# Patient Record
Sex: FEMALE | Race: WHITE | NOT HISPANIC OR LATINO
[De-identification: names, ages, dates, MRNs, and addresses within clinical notes are randomized per-mention and may not be internally consistent; named-entity substitution may affect disease eponyms.]

---

## 2021-12-09 PROBLEM — Z00.00 ENCOUNTER FOR PREVENTIVE HEALTH EXAMINATION: Status: ACTIVE | Noted: 2021-12-09

## 2021-12-13 ENCOUNTER — APPOINTMENT (OUTPATIENT)
Dept: CARDIOLOGY | Facility: CLINIC | Age: 61
End: 2021-12-13
Payer: COMMERCIAL

## 2021-12-13 ENCOUNTER — NON-APPOINTMENT (OUTPATIENT)
Age: 61
End: 2021-12-13

## 2021-12-13 VITALS
HEIGHT: 65 IN | HEART RATE: 74 BPM | SYSTOLIC BLOOD PRESSURE: 180 MMHG | WEIGHT: 164 LBS | OXYGEN SATURATION: 98 % | DIASTOLIC BLOOD PRESSURE: 100 MMHG | BODY MASS INDEX: 27.32 KG/M2

## 2021-12-13 VITALS
OXYGEN SATURATION: 98 % | WEIGHT: 164 LBS | TEMPERATURE: 97.5 F | HEART RATE: 74 BPM | HEIGHT: 65 IN | BODY MASS INDEX: 27.32 KG/M2 | DIASTOLIC BLOOD PRESSURE: 92 MMHG | SYSTOLIC BLOOD PRESSURE: 170 MMHG

## 2021-12-13 DIAGNOSIS — Z82.49 FAMILY HISTORY OF ISCHEMIC HEART DISEASE AND OTHER DISEASES OF THE CIRCULATORY SYSTEM: ICD-10-CM

## 2021-12-13 DIAGNOSIS — Z78.9 OTHER SPECIFIED HEALTH STATUS: ICD-10-CM

## 2021-12-13 DIAGNOSIS — Z72.3 LACK OF PHYSICAL EXERCISE: ICD-10-CM

## 2021-12-13 PROCEDURE — 99204 OFFICE O/P NEW MOD 45 MIN: CPT

## 2021-12-13 PROCEDURE — 99072 ADDL SUPL MATRL&STAF TM PHE: CPT

## 2021-12-13 RX ORDER — LEVOTHYROXINE SODIUM 0.1 MG/1
100 TABLET ORAL
Qty: 90 | Refills: 1 | Status: ACTIVE | COMMUNITY
Start: 2021-11-20

## 2021-12-13 NOTE — PHYSICAL EXAM

## 2021-12-13 NOTE — REASON FOR VISIT
Chart reviewed and call made to patient. Patient stated that he has a blood blister in his cheek. Patient stated that he has been working with the anticoagulation clinic to get back on his warfarin. Patient stated that he was supposed to have a procedure on October 20th but it got cancelled due to an insurance issue. Consequently, he has been on lovenox and warfarin since October 21, and is transitioning back to warfarin. Next scheduled warfarin check is on 11/04/2021.     Patient stated that yesterday he noticed the blood blister last night after dinner and it was small. Patient stated that this morning when he got up it was larger at about a quarter inch diameter. Patient stated that now the center is \"clearing up\" in the center but the edges are continuing to spread. Patient denies any active bleeding anywhere, and the blood is contained in the blister.    Patient stated that he burned his mouth with coffee yesterday and isn't sure whether that contributed to the development of the blister.    ALISHA notified and order received that patient should continue to monitor. Call made to patient and notified him that the blister is likely due to the burn. Notified patient to consume food and drinks of a moderate temperature. Notified patient that if the blister is sore to chew foods on the other side of his mouth and if that still is painful to stick with soft foods. Notified patient that there may be some sloughing off of the burned lining of his mouth. Patient verbalized understanding. Encouraged patient to call with any other questions or concerns.     [FreeTextEntry1] : Valorie is a pleasant 61-year-old female with history of hypothyroidism (Hashimoto's disease) on supplements, labile hypertension, fatty liver on ultrasound many years ago and, hypercholesterolemia.  \par \par She gained more than 40 pounds after she had menopause.  He has not been able to lose weight.\par \par She admits to drinking several glasses of wine on daily basis.  She does not have any symptoms of liver dysfunction although she has mild LFT elevation on labs.\par \par Denies chest pain or shortness of breath.  She does have palpitations but no dizziness or syncope.  No pedal edema.\par \par Hypertension: The patient was treated with a diuretic.  She had leg cramps and she stopped it.\par \par

## 2021-12-13 NOTE — DISCUSSION/SUMMARY
[FreeTextEntry1] : Hypertension: Uncontrolled.  Start metoprolol.  It will also help anxiety, labile hypertension and palpitations.  Nonpharmacologic ways of reducing blood pressure were discussed.  Symptoms of uncontrolled high blood pressure were discussed.  If any, she should go to the ER stat.  Patient understands.  If she takes diuretics in future, she will need to be supplemented with K and mag.  She had muscle cramps with low-dose diuretics in the past.\par \par Hypercholesteremia: Dietary changes to reduce LDL was discussed.  She does not want to start statins at this time.  She is concerned about abnormal LFT.  Cutting back on alcohol is most essential in her case.  She will recheck lipid profile.  If still elevated, she may agree to statin therapy.\par \par Overweight: Because of joint pains and fatigue from thyroid dysfunction, she claims that she is unable to exercise.  Dietary reduction of total calories and carbs was recommended.\par \par Patient has strong family history of premature CAD.  She has several uncontrolled risk factors.  CT calcium score should be obtained.  Significance of calcium score was discussed.\par \par Palpitations: She describes them as fluttering sensation when she wakes up in the night, sometimes with nightmares.  ZIO recording for 3 days should be able to capture her symptoms\par \par Patient with baseline LBBB.  In the future, she will need Lexiscan if CAD evaluation is required.\par \par Follow-up after above tests\par \par Thank you for this referral and allowing me to participate in the care of this patient.  If I can be of any further help or  if you have any questions, please do not hesitate to contact me\par \par \par Sincerely,\par \par Sukh Fraire MD, FACC, [facility]\par \par

## 2021-12-13 NOTE — ASSESSMENT
[FreeTextEntry1] : Reviewed today:\par -EKG December 2021: Sinus rhythm, LBBB\par -Labs May 2021: LDL is 176, HDL 75, .  .  Normal creatinine.  AST 41, ALT 85.  TSH 5.7.\par -Echo 2012, normal\par -Stress echocardiogram 2012: Negative for ischemia.\par -Ultrasound liver in April 2021: Fatty liver.

## 2021-12-21 ENCOUNTER — APPOINTMENT (OUTPATIENT)
Dept: CARDIOLOGY | Facility: CLINIC | Age: 61
End: 2021-12-21
Payer: COMMERCIAL

## 2021-12-21 PROCEDURE — 93306 TTE W/DOPPLER COMPLETE: CPT

## 2021-12-21 PROCEDURE — 93880 EXTRACRANIAL BILAT STUDY: CPT

## 2021-12-21 PROCEDURE — 99072 ADDL SUPL MATRL&STAF TM PHE: CPT

## 2022-01-07 ENCOUNTER — RX RENEWAL (OUTPATIENT)
Age: 62
End: 2022-01-07

## 2022-01-24 ENCOUNTER — RX RENEWAL (OUTPATIENT)
Age: 62
End: 2022-01-24

## 2022-01-26 ENCOUNTER — RX RENEWAL (OUTPATIENT)
Age: 62
End: 2022-01-26

## 2022-01-27 ENCOUNTER — APPOINTMENT (OUTPATIENT)
Dept: CARDIOLOGY | Facility: CLINIC | Age: 62
End: 2022-01-27
Payer: COMMERCIAL

## 2022-01-27 VITALS
HEIGHT: 65 IN | BODY MASS INDEX: 27.66 KG/M2 | WEIGHT: 166 LBS | HEART RATE: 73 BPM | OXYGEN SATURATION: 98 % | TEMPERATURE: 97.3 F

## 2022-01-27 VITALS — DIASTOLIC BLOOD PRESSURE: 92 MMHG | SYSTOLIC BLOOD PRESSURE: 130 MMHG

## 2022-01-27 VITALS — SYSTOLIC BLOOD PRESSURE: 160 MMHG | DIASTOLIC BLOOD PRESSURE: 90 MMHG

## 2022-01-27 PROCEDURE — 99214 OFFICE O/P EST MOD 30 MIN: CPT

## 2022-01-27 PROCEDURE — 99072 ADDL SUPL MATRL&STAF TM PHE: CPT

## 2022-01-27 RX ORDER — METOPROLOL TARTRATE 25 MG/1
25 TABLET, FILM COATED ORAL
Qty: 180 | Refills: 1 | Status: DISCONTINUED | COMMUNITY
Start: 2021-12-13 | End: 2022-01-27

## 2022-01-27 NOTE — DISCUSSION/SUMMARY
[FreeTextEntry1] : Hypertension: Uncontrolled.  She could not tolerate metoprolol.  Blood pressures are not controlled on her home blood pressure log as well as blood pressure reading in the office today.  Change to Cardizem  mg p.o. daily.  Side effects were discussed.  She may need additional ACE inhibitor or diuretic if the blood pressures are not well controlled. \par \par Echocardiogram showed normal EF.  Normal diastolic function.  Normal PASP\par \par If she takes diuretics in future, she will need to be supplemented with K and mag.  She had muscle cramps with low-dose diuretics in the past.\par \par Hypercholesteremia: Dietary changes to reduce LDL was discussed.  She does not want to start statins at this time.  She is concerned about abnormal LFT.  Cutting back on alcohol is most essential in her case.  Lipid profile in December showed LDL of 147.  Calculated 10-year cardiovascular risk is 4.5%.  HDL was 74.\par \par Overweight: Because of joint pains and fatigue from thyroid dysfunction, she claims that she is unable to exercise.  Dietary reduction of total calories and carbs was recommended.\par \par Patient has strong family history of premature CAD.  She has several uncontrolled risk factors.  CT calcium score should be obtained.  Significance of calcium score was discussed.\par \par Palpitations: She describes them as fluttering sensation when she wakes up in the night, sometimes with nightmares.  ZIO recording for 3 days was performed and it showed asymptomatic 6 beat SVT\par \par Patient with baseline LBBB.  In the future, she will need Lexiscan if CAD evaluation is required.\par \par Follow-up for blood pressure next week.  Also CT calcium score is pending.\par \par Thank you for this referral and allowing me to participate in the care of this patient.  If I can be of any further help or  if you have any questions, please do not hesitate to contact me\par \par \par Sincerely,\par \par Sukh Fraire MD, FACC, [facility]\par \par

## 2022-01-27 NOTE — PHYSICAL EXAM

## 2022-01-27 NOTE — REVIEW OF SYSTEMS
[Feeling Fatigued] : feeling fatigued [Dyspnea on exertion] : dyspnea during exertion [Negative] : Psychiatric [Palpitations] : no palpitations

## 2022-01-27 NOTE — REASON FOR VISIT
[FreeTextEntry1] : Valorie is a pleasant 61-year-old female with history of hypothyroidism (Hashimoto's disease) on supplements, labile hypertension, fatty liver on ultrasound many years ago and, hypercholesterolemia.  \par \par She gained more than 40 pounds after she had menopause.  SHe has not been able to lose weight.\par \par She admits to drinking several glasses of wine on daily basis.  She does not have any symptoms of liver dysfunction although she had mild LFT elevation on labs in the past, not on the last labs in December 2021\par \par Denies chest pain or shortness of breath.  She does have palpitations but no dizziness or syncope.  No pedal edema.\par \par Blood pressures are not well controlled on metoprolol today.  She complains of fatigue and headache with metoprolol.\par \par Hypertension: The patient was treated with a diuretic.  She had leg cramps and she stopped it.\par \par

## 2022-02-03 ENCOUNTER — APPOINTMENT (OUTPATIENT)
Dept: CARDIOLOGY | Facility: CLINIC | Age: 62
End: 2022-02-03

## 2022-02-04 ENCOUNTER — APPOINTMENT (OUTPATIENT)
Dept: CARDIOLOGY | Facility: CLINIC | Age: 62
End: 2022-02-04
Payer: COMMERCIAL

## 2022-02-04 ENCOUNTER — NON-APPOINTMENT (OUTPATIENT)
Age: 62
End: 2022-02-04

## 2022-02-04 VITALS
OXYGEN SATURATION: 98 % | SYSTOLIC BLOOD PRESSURE: 162 MMHG | WEIGHT: 162 LBS | HEART RATE: 76 BPM | DIASTOLIC BLOOD PRESSURE: 82 MMHG | HEIGHT: 65 IN | BODY MASS INDEX: 26.99 KG/M2 | TEMPERATURE: 97.8 F

## 2022-02-04 PROCEDURE — 93000 ELECTROCARDIOGRAM COMPLETE: CPT

## 2022-02-04 PROCEDURE — 99214 OFFICE O/P EST MOD 30 MIN: CPT

## 2022-02-04 PROCEDURE — 99072 ADDL SUPL MATRL&STAF TM PHE: CPT

## 2022-02-04 RX ORDER — TURMERIC ROOT EXTRACT 500 MG
TABLET ORAL
Refills: 0 | Status: ACTIVE | COMMUNITY

## 2022-02-04 RX ORDER — LUTEIN 6 MG
TABLET ORAL
Refills: 0 | Status: ACTIVE | COMMUNITY

## 2022-02-04 RX ORDER — CHOLECALCIFEROL (VITAMIN D3) 50 MCG
2000 CAPSULE ORAL
Refills: 0 | Status: ACTIVE | COMMUNITY

## 2022-02-04 RX ORDER — CALCIUM CARBONATE 260MG(650)
100 TABLET,CHEWABLE ORAL
Refills: 0 | Status: ACTIVE | COMMUNITY

## 2022-02-04 RX ORDER — CALCIUM CARBONATE/VITAMIN D3 600 MG-10
TABLET ORAL
Refills: 0 | Status: ACTIVE | COMMUNITY

## 2022-02-04 RX ORDER — MULTIVIT-MIN/IRON/FOLIC ACID/K 18-600-40
CAPSULE ORAL
Refills: 0 | Status: ACTIVE | COMMUNITY

## 2022-02-04 RX ORDER — PYRIDOXINE HCL (VITAMIN B6) 100 MG
200 TABLET ORAL
Refills: 0 | Status: ACTIVE | COMMUNITY

## 2022-02-04 RX ORDER — VITAMIN E 268 MG
100 CAPSULE ORAL
Refills: 0 | Status: ACTIVE | COMMUNITY

## 2022-02-04 RX ORDER — MULTIVITAMIN
TABLET ORAL
Refills: 0 | Status: ACTIVE | COMMUNITY

## 2022-02-04 NOTE — ASSESSMENT
[FreeTextEntry1] : Reviewed today:\par -EKG December 2021: Sinus rhythm, LBBB\par -Labs May 2021: LDL is 176, HDL 75, .  .  Normal creatinine.  AST 41, ALT 85.  TSH 5.7.\par -Echo 2012, normal\par -Stress echocardiogram 2012: Negative for ischemia.\par -Ultrasound liver in April 2021: Fatty liver.\par -CT calcium score January 2022: 0\par -Labs February 2022: A1c 6.4.

## 2022-02-04 NOTE — REASON FOR VISIT
[FreeTextEntry1] : Valorie is a pleasant 61-year-old female with history of hypothyroidism (Hashimoto's disease) on supplements, labile hypertension, fatty liver on ultrasound many years ago and, hypercholesterolemia.  \par \par She gained more than 40 pounds after she had menopause.  SHe has not been able to lose weight.  A1c 6.4\par \par Blood pressure not well controlled.  She is taking Cardizem regularly.  No side effects to it. She had fatigue and headache with metoprolol.\par \par She admits to drinking several glasses of wine on daily basis.  She does not have any symptoms of liver dysfunction although she had mild LFT elevation on labs in the past, not on the last labs in December 2021\par \par Denies chest pain or shortness of breath.  She does have palpitations but no  syncope.  No pedal edema.\par \par Transient left bundle branch block.\par \par Complains of vertigo today.  No tinnitus or decreased hearing.\par \par \par

## 2022-02-04 NOTE — PHYSICAL EXAM

## 2022-02-04 NOTE — REVIEW OF SYSTEMS
[Feeling Fatigued] : not feeling fatigued [Dyspnea on exertion] : not dyspnea during exertion [Palpitations] : no palpitations [Negative] : Psychiatric [FreeTextEntry4] : Vertigo

## 2022-02-04 NOTE — DISCUSSION/SUMMARY
[FreeTextEntry1] : Hypertension: Uncontrolled.  She could not tolerate metoprolol.  Blood pressures are not controlled on her home blood pressure log as well as blood pressure reading in the office today.  Change to Cardizem  mg p.o. daily.  Side effects were discussed.  She may need additional ACE inhibitor or diuretic if the blood pressures are not well controlled. \par \par Echocardiogram showed normal EF.  Normal diastolic function.  Normal PASP\par \par CT calcium score was 0\par \par If she takes diuretics in future, she will need to be supplemented with K and mag.  She had muscle cramps with low-dose diuretics in the past.\par \par Hypercholesteremia: Dietary changes to reduce LDL was discussed.  She does not want to start statins at this time.  She is concerned about abnormal LFT.  Cutting back on alcohol is most essential in her case.  Lipid profile in December showed LDL of 147.  Calculated 10-year cardiovascular risk is 4.5%.  HDL was 74.\par \par Overweight: Because of joint pains and fatigue from thyroid dysfunction, she claims that she is unable to exercise.  Dietary reduction of total calories and carbs was recommended.  Her A1c is 6.4.  There is family history of diabetes.\par \par Patient has strong family history of premature CAD.  She has several uncontrolled risk factors.  CT calcium score should be obtained.  Significance of calcium score was discussed.\par \par Palpitations: She describes them as fluttering sensation when she wakes up in the night, sometimes with nightmares.  ZIO recording for 3 days was performed and it showed asymptomatic 6 beat SVT.  \par \par She has transient left bundle branch block.  Today's EKG does not have QRS widening.  T inversion in V1 to V6 is persistent\par \par \par \par Follow-up for blood pressure next week.  Also CT calcium score is pending.\par \par Thank you for this referral and allowing me to participate in the care of this patient.  If I can be of any further help or  if you have any questions, please do not hesitate to contact me\par \par \par Sincerely,\par \par Sukh Fraire MD, FACC, [facility]\par \par

## 2022-02-10 ENCOUNTER — APPOINTMENT (OUTPATIENT)
Dept: CARDIOLOGY | Facility: CLINIC | Age: 62
End: 2022-02-10
Payer: COMMERCIAL

## 2022-02-10 VITALS — SYSTOLIC BLOOD PRESSURE: 164 MMHG | DIASTOLIC BLOOD PRESSURE: 78 MMHG

## 2022-02-10 VITALS
SYSTOLIC BLOOD PRESSURE: 150 MMHG | HEART RATE: 94 BPM | WEIGHT: 162 LBS | TEMPERATURE: 97.7 F | HEIGHT: 65 IN | OXYGEN SATURATION: 100 % | DIASTOLIC BLOOD PRESSURE: 82 MMHG | BODY MASS INDEX: 26.99 KG/M2

## 2022-02-10 DIAGNOSIS — Z78.9 OTHER SPECIFIED HEALTH STATUS: ICD-10-CM

## 2022-02-10 PROCEDURE — 99214 OFFICE O/P EST MOD 30 MIN: CPT

## 2022-02-10 PROCEDURE — 99072 ADDL SUPL MATRL&STAF TM PHE: CPT

## 2022-02-10 NOTE — REVIEW OF SYSTEMS
[Negative] : Psychiatric [Feeling Fatigued] : not feeling fatigued [Dyspnea on exertion] : not dyspnea during exertion [Palpitations] : palpitations [FreeTextEntry4] : Vertigo [FreeTextEntry5] : intermittent

## 2022-02-10 NOTE — ASSESSMENT
[FreeTextEntry1] : Reviewed today:\par -CT calcium score January 2022: 0\par -Labs Dec/February 2022: A1c 6.4 Total Chol 253 HDL 74  AST 33 Alt 56\par -Echo 12/2021: EF 55-60% Mild MR Normal LV systolic function and no seg. wall motion abnormalities. Normal PASP. \par \par -EKG December 2021: Sinus rhythm, LBBB\par -Labs May 2021: LDL is 176, HDL 75, .  .  Normal creatinine.  AST 41, ALT 85.  TSH 5.7.\par -Echo 2012, normal\par -Stress echocardiogram 2012: Negative for ischemia.\par -Ultrasound liver in April 2021: Fatty liver.\par

## 2022-02-10 NOTE — PHYSICAL EXAM
[Well Developed] : well developed [Well Nourished] : well nourished [No Acute Distress] : no acute distress [Normal Venous Pressure] : normal venous pressure [No Carotid Bruit] : no carotid bruit [Normal S1, S2] : normal S1, S2 [No Murmur] : no murmur [No Rub] : no rub [No Gallop] : no gallop [Clear Lung Fields] : clear lung fields [Good Air Entry] : good air entry [No Respiratory Distress] : no respiratory distress  [Normal Gait] : normal gait [Moves all extremities] : moves all extremities [Alert and Oriented] : alert and oriented [de-identified] : 1+ BLE edema

## 2022-02-10 NOTE — DISCUSSION/SUMMARY
[FreeTextEntry1] : CIPRIANO SESAY is a 61 year old F who presents today Feb 10, 2022 with the above history and the following active issues: \par \par Hypertension: \par -Uncontrolled on increased dose of Cardizem  mg. We have added losartan 25mg daily. She could not tolerate metoprolol or diuretics in the past.  Blood pressures are not controlled on her home blood pressure log as well as blood pressure reading in the office today. Side effects were discussed.  She will RTO in 10 days for repeat BP check and review BMP.\par - Non-pharmacological ways of reducing BP have been discussed.\par -Dietary and lifestyle changes to reduce weight including exercise program, reduction of total calories and reduction of carbs was discussed. I have also discussed staying OFF alcohol. \par -If she takes diuretics in future, she will need to be supplemented with K and mag.  She had muscle cramps with low-dose diuretics in the past.\par \par Reviewed in detail her Echo, CT calcium score, carotid  and labs. \par \par Hypercholesteremia: Dietary changes to reduce LDL was discussed.  She does not want to start statins at this time.  She is concerned about abnormal LFT.  Cutting back on alcohol is most essential in her case.  Lipid profile in December showed LDL of 147.  Calculated 10-year cardiovascular risk is 4.5%.  HDL was 74. CT calcium score 0. Primary prevention discussed with patient zo since her family hx of premature CAD\par \par Overweight: Because of joint pains and fatigue from thyroid dysfunction, she claims that she is unable to exercise.  Dietary reduction of total calories and carbs was recommended.  Her A1c is 6.4.  There is family history of diabetes.\par \par Palpitations: She describes them as fluttering sensation when she wakes up in the night, sometimes with nightmares.  ZIO recording for 3 days was performed and it showed asymptomatic 6 beat SVT.  \par \par She has transient left bundle branch block.  Previous visits EKG does not have QRS widening.  T inversion in V1 to V6 is persistent\par \par I have given her names of Endocrinologists in the area- she is to call and see if they take her insurance. \par Due to her anxiety and stress we had a lengthy discussion regarding meditation, yoga, exercise to help with her mental health.\par \par Follow-up for blood pressure next week.\par \par Thank you for this referral and allowing me to participate in the care of this patient.  If I can be of any further help or  if you have any questions, please do not hesitate to contact me\par \par Any questions and concerns were addressed and resolved. \par \par Sincerely,\par \par Shanda Melvin ANP-C\par Patients history, testing, and plan reviewed with supervising MD: Dr. Sukh Fraire MD, FAC, [facility] \par

## 2022-02-10 NOTE — REASON FOR VISIT
[FreeTextEntry1] : Valorie is a pleasant 61-year-old female with history of hypothyroidism (Hashimoto's disease) on supplements, labile hypertension, fatty liver on ultrasound many years ago and, hypercholesterolemia.  \par \par She gained more than 40 pounds after she had menopause.  She has not been able to lose weight.  A1c 6.4. \par \par Blood pressure still not well controlled.  She states she is taking Cardizem regularly at the increased dose.  She has mild dependent edema on medication. She had fatigue and headache with metoprolol previously. She has trailed a diuretic in the past and had muscle cramping. \par \par She admits to drinking several glasses of wine on daily basis however states in the past 2 weeks she has not had any wine.  She does not have any symptoms of liver dysfunction although she had mild LFT elevation on labs in the past, not on the last labs in December 2021.\par \par Denies chest pain or shortness of breath.  She does have palpitations but no syncope.  \par \par Transient left bundle branch block.\par \par \par \par \par

## 2022-02-22 ENCOUNTER — APPOINTMENT (OUTPATIENT)
Dept: CARDIOLOGY | Facility: CLINIC | Age: 62
End: 2022-02-22

## 2022-02-28 ENCOUNTER — APPOINTMENT (OUTPATIENT)
Dept: CARDIOLOGY | Facility: CLINIC | Age: 62
End: 2022-02-28
Payer: COMMERCIAL

## 2022-02-28 VITALS
TEMPERATURE: 97.31 F | SYSTOLIC BLOOD PRESSURE: 150 MMHG | HEIGHT: 65 IN | WEIGHT: 166 LBS | BODY MASS INDEX: 27.66 KG/M2 | DIASTOLIC BLOOD PRESSURE: 84 MMHG | HEART RATE: 80 BPM | OXYGEN SATURATION: 97 %

## 2022-02-28 PROCEDURE — 99072 ADDL SUPL MATRL&STAF TM PHE: CPT

## 2022-02-28 PROCEDURE — 99214 OFFICE O/P EST MOD 30 MIN: CPT

## 2022-02-28 NOTE — REVIEW OF SYSTEMS
[Feeling Fatigued] : not feeling fatigued [Dyspnea on exertion] : not dyspnea during exertion [Palpitations] : palpitations [Negative] : Psychiatric [FreeTextEntry4] : Vertigo [FreeTextEntry5] : intermittent

## 2022-02-28 NOTE — PHYSICAL EXAM
[Well Developed] : well developed [Well Nourished] : well nourished [No Acute Distress] : no acute distress [Normal Venous Pressure] : normal venous pressure [No Carotid Bruit] : no carotid bruit [Normal S1, S2] : normal S1, S2 [No Murmur] : no murmur [No Rub] : no rub [No Gallop] : no gallop [Clear Lung Fields] : clear lung fields [Good Air Entry] : good air entry [No Respiratory Distress] : no respiratory distress  [Normal Gait] : normal gait [No Edema] : no edema [No Cyanosis] : no cyanosis [No Clubbing] : no clubbing [Moves all extremities] : moves all extremities [Alert and Oriented] : alert and oriented

## 2022-02-28 NOTE — ASSESSMENT
[FreeTextEntry1] : Reviewed today:\par -CT calcium score January 2022: 0\par -Labs Dec/February 2022: A1c 6.4 Total Chol 253 HDL 74  AST 33 Alt 56\par -Echo 12/2021: EF 55-60% Mild MR Normal LV systolic function and no seg. wall motion abnormalities. Normal PASP. \par -EKG December 2021: Sinus rhythm, LBBB\par -Labs May 2021: LDL is 176, HDL 75, .  .  Normal creatinine.  AST 41, ALT 85.  TSH 5.7.\par -Echo 2012, normal\par -Stress echocardiogram 2012: Negative for ischemia.\par -Ultrasound liver in April 2021: Fatty liver.\par -BMP February 2022\par

## 2022-02-28 NOTE — DISCUSSION/SUMMARY
[FreeTextEntry1] : CIPRIANO SESAY is a 61 year old F with following active issues: \par \par Hypertension: \par -Uncontrolled on increased dose of Cardizem  mg. We have added losartan 25mg daily. She could not tolerate metoprolol or diuretics in the past.  Blood pressures are not controlled on her home blood pressure log as well as blood pressure reading in the office today.  Increase losartan to 50 mg daily.  Check blood pressure in 1 week.\par - Non-pharmacological ways of reducing BP have been discussed.  He does have significant stress and anxiety\par -Dietary and lifestyle changes to reduce weight including exercise program, reduction of total calories and reduction of carbs was discussed. I have also discussed staying OFF alcohol. \par -If she takes diuretics in future, she will need to be supplemented with K and mag.  She had muscle cramps with low-dose diuretics in the past.\par \par Reviewed in detail her Echo, CT calcium score, carotid  and labs. \par \par Hypercholesteremia: Dietary changes to reduce LDL was discussed.  She does not want to start statins at this time.  She is concerned about abnormal LFT.  Cutting back on alcohol is most essential in her case.  Lipid profile in December showed LDL of 147.  Calculated 10-year cardiovascular risk is 4.5%.  HDL was 74. CT calcium score 0. Primary prevention discussed with patient zo since her family hx of premature CAD\par \par Overweight: Because of joint pains and fatigue from thyroid dysfunction, she claims that she is unable to exercise.  Dietary reduction of total calories and carbs was recommended.  Her A1c is 6.4.  There is family history of diabetes.  Patient is quite frustrated due to weight gain, uncontrolled blood pressures and not feeling well\par \par Palpitations: She describes them as fluttering sensation when she wakes up in the night, sometimes with nightmares.  ZIO recording for 3 days was performed and it showed asymptomatic 6 beat SVT.  This has almost resolved and she does not complain of it anymore\par \par She now complains of vertigo with tinnitus.  Trial of Antivert was suggested.  ENT consultation was suggested.\par \par She has transient left bundle branch block.  Previous visits EKG does not have QRS widening.  T inversion in V1 to V6 is persistent\par \par Thank you for this referral and allowing me to participate in the care of this patient.  If I can be of any further help or  if you have any questions, please do not hesitate to contact me\par \par \par Sincerely,\par \par Sukh Fraire MD, FACC, [facility]\par \par

## 2022-02-28 NOTE — REASON FOR VISIT
[FreeTextEntry1] : Valorie is a pleasant 61-year-old female with history of hypothyroidism (Hashimoto's disease) on supplements, labile hypertension, fatty liver on ultrasound many years ago and, hypercholesterolemia.  \par \par She gained more than 40 pounds after she had menopause.  She has not been able to lose weight.  A1c 6.4.  She has gained 4 pounds since earlier in the year\par \par Blood pressure still not well controlled.  She states she is taking Cardizem regularly at the increased dose.  She has mild dependent edema on medication. She had fatigue and headache with metoprolol previously. She has trailed a diuretic in the past and had muscle cramping. \par \par Today, she complains of vertigo and slight tinnitus\par She admits to drinking several glasses of wine on daily basis however states in the past several weeks she has not had any wine.  She does not have any symptoms of liver dysfunction although she had mild LFT elevation on labs in the past, not on the last labs in December 2021.\par \par Denies chest pain or shortness of breath.  She does have palpitations but no syncope.  \par \par Transient left bundle branch block.\par \par \par \par \par

## 2022-03-07 ENCOUNTER — APPOINTMENT (OUTPATIENT)
Dept: CARDIOLOGY | Facility: CLINIC | Age: 62
End: 2022-03-07
Payer: COMMERCIAL

## 2022-03-07 VITALS — SYSTOLIC BLOOD PRESSURE: 150 MMHG | DIASTOLIC BLOOD PRESSURE: 78 MMHG

## 2022-03-07 VITALS
SYSTOLIC BLOOD PRESSURE: 138 MMHG | HEIGHT: 65 IN | WEIGHT: 165 LBS | BODY MASS INDEX: 27.49 KG/M2 | DIASTOLIC BLOOD PRESSURE: 78 MMHG | HEART RATE: 83 BPM | RESPIRATION RATE: 12 BRPM | TEMPERATURE: 97.7 F | OXYGEN SATURATION: 99 %

## 2022-03-07 PROCEDURE — 99072 ADDL SUPL MATRL&STAF TM PHE: CPT

## 2022-03-07 PROCEDURE — 99213 OFFICE O/P EST LOW 20 MIN: CPT

## 2022-03-07 RX ORDER — LOSARTAN POTASSIUM 50 MG/1
50 TABLET, FILM COATED ORAL DAILY
Qty: 90 | Refills: 0 | Status: DISCONTINUED | COMMUNITY
Start: 2022-02-10 | End: 2022-03-07

## 2022-03-07 NOTE — REVIEW OF SYSTEMS
[Palpitations] : palpitations [Negative] : Psychiatric [Headache] : headache [Feeling Fatigued] : not feeling fatigued [Dyspnea on exertion] : not dyspnea during exertion [FreeTextEntry4] : dizziness [FreeTextEntry5] : intermittent

## 2022-03-07 NOTE — REASON FOR VISIT
[FreeTextEntry1] : Valorie is a pleasant 61-year-old female with history of hypothyroidism (Hashimoto's disease) on supplements, labile hypertension, fatty liver on ultrasound many years ago and, hypercholesterolemia.  \par \par She gained more than 40 pounds after she had menopause.  She has not been able to lose weight.  A1c 6.4.  She has gained 4 pounds since earlier in the year. Her weight is remaining in the mid 160's. \par \par Blood pressure still not well controlled.  She states she is taking Cardizem regularly at the increased dose.  She has intermittent mild dependent edema on medication- none noted on today's exam. She had fatigue and headache with metoprolol previously. She has trailed a diuretic in the past and had muscle cramping. \par \par Today March 7, 2022 she complains of HA and dizziness. Her HA she states started when she started the Cardizem and it has never stopped. Despite this side effects she states she is compliant with it. The dizziness has gotten worse since we increased her losartan. He home BP readings are ranging from 144-156 SBP. \par  She consulted with Dr Davenport for eval of dizziness and his workup was unremarkable as per the patient. Consult requested. \par She admits to drinking several glasses of wine on daily basis however states in the past several weeks she had 1 glass on Saturdays.  She does not have any symptoms of liver dysfunction although she had mild LFT elevation on labs in the past, not on the last labs in December 2021.\par \par Denies chest pain or shortness of breath.  She does have palpitations but no syncope.  \par \par Transient left bundle branch block.\par \par \par \par \par

## 2022-03-07 NOTE — DISCUSSION/SUMMARY
[FreeTextEntry1] : CIPRIANO SESAY is a 61 year old F who presents today Mar 07, 2022 with the above history and the following active issues: \par \par Hypertension: \par -Uncontrolled on increased dose of Cardizem  mg. Last visit we increased Losartan to 50mg now with persistent dizziness. She consulted ENT which his workup pt states was neg. She could not tolerate metoprolol or diuretics in the past.  Blood pressures are not controlled on her home blood pressure log as well as blood pressure reading in the office today.  \par - Continue Cardizem 240mg. Stop Losartan. Trial on Lisinopril/ HCTZ 10/12.5mg. Pt is agreeable\par - Non-pharmacological ways of reducing BP have been discussed.  He does have significant stress and anxiety\par -Dietary and lifestyle changes to reduce weight including exercise program, reduction of total calories and reduction of carbs was discussed. I have also discussed staying OFF alcohol. \par -BMP/Mag in 2 weeks. She had muscle cramps with low-dose diuretics in the past. Advised to take Magnesium. Adequate hydration.\par \par Hypercholesteremia: Dietary changes to reduce LDL was discussed.  She does not want to start statins at this time.  She is concerned about abnormal LFT.  Cutting back on alcohol is most essential in her case.  Lipid profile in December showed LDL of 147.  Calculated 10-year cardiovascular risk is 4.5%.  HDL was 74. CT calcium score 0. Primary prevention discussed with patient zo since her family hx of premature CAD\par \par Overweight: Because of joint pains and fatigue from thyroid dysfunction, she claims that she is unable to exercise.  Dietary reduction of total calories and carbs was recommended.  Her A1c is 6.4.  There is family history of diabetes.  Patient is quite frustrated due to weight gain, uncontrolled blood pressures and not feeling well\par \par Palpitations: She describes them as fluttering sensation when she wakes up in the night, sometimes with nightmares.  ZIO recording for 3 days was performed and it showed asymptomatic 6 beat SVT.  This has almost resolved and she does not complain of it anymore\par \par She now complains of vertigo with tinnitus.  Trial of Antivert was suggested.  ENT consultation was done and requested.\par \par She has transient left bundle branch block.  Previous visits EKG does not have QRS widening.  T inversion in V1 to V6 is persistent\par \par She will follow up in 1 month for BP recheck. \par If still uncontrolled we will re-discuss consult with Dr Johnson (HTN specialist)\par Thank you for this referral and allowing me to participate in the care of this patient.  If I can be of any further help or  if you have any questions, please do not hesitate to contact me\par \par Sincerely,\par \par Shanda Melvin ANP-C\par Patients history, testing, and plan reviewed with supervising MD: Dr. Tavo Carter \par

## 2022-03-07 NOTE — ASSESSMENT
[FreeTextEntry1] : Previous testing:\par -CT calcium score January 2022: 0\par -Labs Dec/February 2022: A1c 6.4 Total Chol 253 HDL 74  AST 33 Alt 56\par -Echo 12/2021: EF 55-60% Mild MR Normal LV systolic function and no seg. wall motion abnormalities. Normal PASP. \par -EKG December 2021: Sinus rhythm, LBBB\par -Labs May 2021: LDL is 176, HDL 75, .  .  Normal creatinine.  AST 41, ALT 85.  TSH 5.7.\par -Echo 2012, normal\par -Stress echocardiogram 2012: Negative for ischemia.\par -Ultrasound liver in April 2021: Fatty liver.\par -BMP February 2022\par

## 2022-03-07 NOTE — ADDENDUM
[FreeTextEntry1] : Please note the patient was reviewed with NP Amee Melvin\vilma I was physically present during the service of the patient.\vilma I was directly involved in the management plan and recommendations of the care provided to the patient. \vilma I personally reviewed the history and physical examination as documented by the NP above.\par Mar  7 2022  8:00AM\par

## 2022-03-31 ENCOUNTER — APPOINTMENT (OUTPATIENT)
Dept: CARDIOLOGY | Facility: CLINIC | Age: 62
End: 2022-03-31
Payer: COMMERCIAL

## 2022-03-31 VITALS
SYSTOLIC BLOOD PRESSURE: 150 MMHG | HEART RATE: 80 BPM | OXYGEN SATURATION: 98 % | BODY MASS INDEX: 26.82 KG/M2 | RESPIRATION RATE: 12 BRPM | DIASTOLIC BLOOD PRESSURE: 78 MMHG | WEIGHT: 161 LBS | HEIGHT: 65 IN | TEMPERATURE: 97.5 F

## 2022-03-31 PROCEDURE — 99214 OFFICE O/P EST MOD 30 MIN: CPT

## 2022-03-31 PROCEDURE — 99072 ADDL SUPL MATRL&STAF TM PHE: CPT

## 2022-03-31 NOTE — REASON FOR VISIT
[FreeTextEntry1] : Valorie is a pleasant 62-year-old female with history of hypothyroidism (Hashimoto's disease) on supplements, labile hypertension, fatty liver on ultrasound many years ago and, hypercholesterolemia.  \par \par She gained more than 40 pounds after she had menopause.  She has not been able to lose weight.  A1c 6.4.  Her weight is remaining in the mid 160's. \par \par Blood pressure today is not well controlled.  She states she is taking Cardizem regularly at the increased dose.  She has intermittent mild dependent edema on medication- none noted on today's exam. She had fatigue and headache with metoprolol previously. She has trailed a diuretic in the past and had muscle cramping. \par \par Her HA started when she started the Cardizem and it has never stopped.  She consulted with Dr Davenport for eval of dizziness and his workup was unremarkable as per the patient. \par \par She admits to drinking several glasses of wine on daily basis however states in the past several weeks she had 1 glass on Saturdays.  She does not have any symptoms of liver dysfunction although she had mild LFT elevation on labs in the past, not on the last labs in December 2021.\par \par Denies chest pain or shortness of breath.  She does have palpitations but no syncope.  \par \par Transient left bundle branch block.  No presyncope or syncope\par \par \par \par \par

## 2022-03-31 NOTE — REVIEW OF SYSTEMS
[Headache] : headache [Negative] : Psychiatric [Feeling Fatigued] : feeling fatigued [Dyspnea on exertion] : not dyspnea during exertion [Palpitations] : no palpitations [FreeTextEntry4] : dizziness [FreeTextEntry5] : intermittent

## 2022-03-31 NOTE — DISCUSSION/SUMMARY
[FreeTextEntry1] : CIPRIANO SESAY is a 61 year old F who presents today Mar 07, 2022 with the above history and the following active issues: \par \par Hypertension: \par -uncontrolled on increased dose of Cardizem  mg.  She claims that at home her blood pressures are fairly lower.  We will start ambulatory blood pressure monitoring on her.   She could not tolerate metoprolol or diuretics in the past. \par - Continue Cardizem 240mg. \par - Non-pharmacological ways of reducing BP have been discussed.  He does have significant stress and anxiety\par -Dietary and lifestyle changes to reduce weight including exercise program, reduction of total calories and reduction of carbs was discussed. I have also discussed staying OFF alcohol. \par \par She has several wearing and nonspecific symptoms such as fatigue, headache, vertigo, etc.  She consulted ENT which his workup pt states was neg.\par \par Hypercholesteremia: Dietary changes to reduce LDL was discussed.  She does not want to start statins at this time.  She is concerned about abnormal LFT.  Cutting back on alcohol is most essential in her case.  Lipid profile in December showed LDL of 147.  Calculated 10-year cardiovascular risk is 4.5%.  HDL was 74. CT calcium score 0. Primary prevention discussed with patient zo since her family hx of premature CAD\par \par Overweight: Because of joint pains and fatigue from thyroid dysfunction, she claims that she is unable to exercise.  Dietary reduction of total calories and carbs was recommended.  Her A1c is 6.4.  There is family history of diabetes.  Patient is quite frustrated due to weight gain, randomly uncontrolled blood pressures and not feeling well\par \par Palpitations: She describes them as fluttering sensation when she wakes up in the night, sometimes with nightmares.  ZIO recording for 3 days was performed and it showed asymptomatic 6 beat SVT.  Potation's have declined significantly\par \par She now complains of vertigo with tinnitus.  Trial of Antivert was suggested.  ENT consultation was already done\par \par She has transient left bundle branch block.  Previous visits EKG does not have QRS widening.  T inversion in V1 to V6 is persistent\par \par Patient does not want to perform ETT at this time.  She feels that she is too tired.  She does not have any typical symptoms of CAD at this time.\par \par She feels that her thyroid function is affecting her health and causing symptoms.  She will discuss this with primary care.\par \par Thank you for this referral and allowing me to participate in the care of this patient.  If I can be of any further help or  if you have any questions, please do not hesitate to contact me\par \par \par Sincerely,\par \par Sukh Fraire MD, FAC, [facility]

## 2022-04-07 ENCOUNTER — NON-APPOINTMENT (OUTPATIENT)
Age: 62
End: 2022-04-07

## 2022-04-07 ENCOUNTER — APPOINTMENT (OUTPATIENT)
Dept: CARDIOLOGY | Facility: CLINIC | Age: 62
End: 2022-04-07

## 2022-04-28 ENCOUNTER — APPOINTMENT (OUTPATIENT)
Dept: CARDIOLOGY | Facility: CLINIC | Age: 62
End: 2022-04-28
Payer: COMMERCIAL

## 2022-04-28 PROCEDURE — 93015 CV STRESS TEST SUPVJ I&R: CPT

## 2022-04-30 ENCOUNTER — RX RENEWAL (OUTPATIENT)
Age: 62
End: 2022-04-30

## 2022-07-07 ENCOUNTER — APPOINTMENT (OUTPATIENT)
Dept: CARDIOLOGY | Facility: CLINIC | Age: 62
End: 2022-07-07

## 2022-07-07 ENCOUNTER — NON-APPOINTMENT (OUTPATIENT)
Age: 62
End: 2022-07-07

## 2022-07-14 ENCOUNTER — APPOINTMENT (OUTPATIENT)
Dept: CARDIOLOGY | Facility: CLINIC | Age: 62
End: 2022-07-14

## 2022-07-14 VITALS
TEMPERATURE: 97.5 F | SYSTOLIC BLOOD PRESSURE: 160 MMHG | BODY MASS INDEX: 26.99 KG/M2 | HEIGHT: 65 IN | OXYGEN SATURATION: 98 % | RESPIRATION RATE: 12 BRPM | WEIGHT: 162 LBS | DIASTOLIC BLOOD PRESSURE: 82 MMHG | HEART RATE: 88 BPM

## 2022-07-14 DIAGNOSIS — E78.00 PURE HYPERCHOLESTEROLEMIA, UNSPECIFIED: ICD-10-CM

## 2022-07-14 DIAGNOSIS — I10 ESSENTIAL (PRIMARY) HYPERTENSION: ICD-10-CM

## 2022-07-14 DIAGNOSIS — E06.3 AUTOIMMUNE THYROIDITIS: ICD-10-CM

## 2022-07-14 DIAGNOSIS — R00.2 PALPITATIONS: ICD-10-CM

## 2022-07-14 DIAGNOSIS — I44.7 LEFT BUNDLE-BRANCH BLOCK, UNSPECIFIED: ICD-10-CM

## 2022-07-14 DIAGNOSIS — R07.9 CHEST PAIN, UNSPECIFIED: ICD-10-CM

## 2022-07-14 DIAGNOSIS — I77.9 DISORDER OF ARTERIES AND ARTERIOLES, UNSPECIFIED: ICD-10-CM

## 2022-07-14 DIAGNOSIS — K76.0 FATTY (CHANGE OF) LIVER, NOT ELSEWHERE CLASSIFIED: ICD-10-CM

## 2022-07-14 PROCEDURE — 99215 OFFICE O/P EST HI 40 MIN: CPT

## 2022-07-14 NOTE — DISCUSSION/SUMMARY
[FreeTextEntry1] : CIPRIANO SESAY is a 62 year old F with following active issues: \par \par Atypical left-sided chest pain, abnormal ETT, left bundle branch block : Further evaluation with CTA versus cardiac cath was discussed.  The patient is agreeable to CTA of the coronaries.  \par \par Hypertension: \par -uncontrolled on increased dose of Cardizem  mg.  She claims that at home her blood pressures are controlled at home.  She has not tapered herself off Cardizem due to several symptoms which she blames on Cardizem. She could not tolerate metoprolol or diuretics in the past. \par - Non-pharmacological ways of reducing BP have been discussed.  He does have significant stress and anxiety.  Also reduction of alcohol in her case\par -Dietary and lifestyle changes to reduce weight including exercise program, reduction of total calories and reduction of carbs was discussed.\par -Increased to lisinopril–HCTZ  to 20–12 0.5\par \par She has several transient and nonspecific symptoms such as fatigue, headache, vertigo, etc.  She consulted ENT which his workup pt states was neg.\par \par Hypercholesteremia: Dietary changes to reduce LDL was discussed.  She does not want to start statins at this time.  She is concerned about abnormal LFT.  Cutting back on alcohol is most essential in her case.  Lipid profile in December showed LDL of 147.  Calculated 10-year cardiovascular risk is 4.5%.  HDL was 74. CT calcium score 0. Primary prevention discussed with patient zo since her family hx of premature CAD\par \par Overweight: Because of joint pains and fatigue from thyroid dysfunction, she claims that she is unable to exercise.  Dietary reduction of total calories and carbs was recommended.  Her A1c is 6.4.  There is family history of diabetes.  Patient is quite frustrated due to weight gain, randomly uncontrolled blood pressures and not feeling well\par \par Palpitations: Almost resolved with stopping Cardizem.\par \par She has transient left bundle branch block.  Previous visits EKG does not have QRS widening.  T inversion in V1 to V6 is persistent\par \par She feels that her thyroid function is affecting her health and causing symptoms.  She will discuss this with primary care.\par \par She will call for the results of CTA coronaries \par \par thank you for this referral and allowing me to participate in the care of this patient.  If I can be of any further help or  if you have any questions, please do not hesitate to contact me\par \par \par Sincerely,\par \par Sukh Fraire MD, FACC, [facility]

## 2022-07-14 NOTE — REASON FOR VISIT
[FreeTextEntry1] : Valorie is a pleasant 62-year-old female with history of hypothyroidism (Hashimoto's disease) on supplements, labile hypertension, fatty liver on ultrasound many years ago and, hypercholesterolemia.  \par \par She gained more than 40 pounds after she had menopause.  She has not been able to lose weight.  A1c 6.4.  Her weight is remaining in the mid 160's. \par \par Blood pressure today is not well controlled.  She insists that her blood pressures at home are normal.  Today it was 118/70.  In the office, blood pressure is uncontrolled again.  Patient is asymptomatic.\par \par He feels that she has several symptoms since Cardizem was started including edema and headache.  She has tapered herself off Cardizem now off and feeling improved. She consulted with Dr Davenport for eval of dizziness and headache and his workup was unremarkable as per the patient. \par \par She had fatigue and headache with metoprolol previously. She has trailed a diuretic in the past and had muscle cramping. \par \par She admits to drinking several glasses of wine on daily basis however states in the past several weeks she had 1 glass on Saturdays.  She does not have any symptoms of liver dysfunction although she had mild LFT elevation on labs in the past, not on the last labs in December 2021.\par \par Denies shortness of breath.  She does have palpitations but no syncope.  \par \par Transient left bundle branch block was again on 7/7/2022.    no presyncope or syncope.  Stress echo was canceled.  She has episodes of atypical chest pain as noted before had abnormal ETT in April 2022\par \par \par \par \par

## 2022-07-14 NOTE — REVIEW OF SYSTEMS
[Headache] : headache [Feeling Fatigued] : feeling fatigued [Negative] : Psychiatric [Dyspnea on exertion] : not dyspnea during exertion [Palpitations] : no palpitations [FreeTextEntry4] : dizziness [FreeTextEntry5] : intermittent

## 2022-07-28 ENCOUNTER — TRANSCRIPTION ENCOUNTER (OUTPATIENT)
Age: 62
End: 2022-07-28

## 2022-08-01 RX ORDER — LOSARTAN POTASSIUM 25 MG/1
25 TABLET, FILM COATED ORAL
Qty: 30 | Refills: 0 | Status: DISCONTINUED | COMMUNITY
Start: 2022-02-10 | End: 2022-08-01

## 2022-08-01 RX ORDER — DILTIAZEM HYDROCHLORIDE 180 MG/1
180 CAPSULE, EXTENDED RELEASE ORAL
Qty: 30 | Refills: 0 | Status: DISCONTINUED | COMMUNITY
Start: 2022-01-27 | End: 2022-08-01

## 2022-08-01 RX ORDER — DILTIAZEM HYDROCHLORIDE 240 MG/1
240 CAPSULE, EXTENDED RELEASE ORAL
Qty: 90 | Refills: 3 | Status: DISCONTINUED | COMMUNITY
Start: 2022-01-27 | End: 2022-08-01

## 2022-08-02 ENCOUNTER — NON-APPOINTMENT (OUTPATIENT)
Age: 62
End: 2022-08-02

## 2022-08-02 RX ORDER — LISINOPRIL AND HYDROCHLOROTHIAZIDE TABLETS 10; 12.5 MG/1; MG/1
10-12.5 TABLET ORAL
Qty: 90 | Refills: 2 | Status: ACTIVE | COMMUNITY
Start: 2022-03-07 | End: 1900-01-01

## 2022-12-18 ENCOUNTER — RX RENEWAL (OUTPATIENT)
Age: 62
End: 2022-12-18